# Patient Record
Sex: MALE | Race: WHITE
[De-identification: names, ages, dates, MRNs, and addresses within clinical notes are randomized per-mention and may not be internally consistent; named-entity substitution may affect disease eponyms.]

---

## 2017-03-17 ENCOUNTER — HOSPITAL ENCOUNTER (OUTPATIENT)
Dept: HOSPITAL 47 - LABWHC1 | Age: 57
Discharge: HOME | End: 2017-03-17
Payer: COMMERCIAL

## 2017-03-17 DIAGNOSIS — Z51.81: Primary | ICD-10-CM

## 2017-03-17 DIAGNOSIS — Z79.899: ICD-10-CM

## 2017-03-17 LAB
ALP SERPL-CCNC: 62 U/L (ref 38–126)
ALT SERPL-CCNC: 42 U/L (ref 21–72)
ANION GAP SERPL CALC-SCNC: 10 MMOL/L
AST SERPL-CCNC: 30 U/L (ref 17–59)
BASOPHILS # BLD AUTO: 0 K/UL (ref 0–0.2)
BASOPHILS NFR BLD AUTO: 1 %
BILIRUBIN DIRECT+TOT PNL SERPL-MCNC: 0.2 MG/DL (ref 0–0.2)
BUN SERPL-SCNC: 20 MG/DL (ref 9–20)
CALCIUM SPEC-MCNC: 8.6 MG/DL (ref 8.4–10.2)
CH: 30.9
CHCM: 34
CHLORIDE SERPL-SCNC: 104 MMOL/L (ref 98–107)
CHOLEST SERPL-MCNC: 126 MG/DL (ref ?–200)
CO2 SERPL-SCNC: 30 MMOL/L (ref 22–30)
EOSINOPHIL # BLD AUTO: 0.1 K/UL (ref 0–0.7)
EOSINOPHIL NFR BLD AUTO: 4 %
ERYTHROCYTE [DISTWIDTH] IN BLOOD BY AUTOMATED COUNT: 5.17 M/UL (ref 4.3–5.9)
ERYTHROCYTE [DISTWIDTH] IN BLOOD: 13 % (ref 11.5–15.5)
GLUCOSE SERPL-MCNC: 89 MG/DL (ref 74–99)
HCT VFR BLD AUTO: 47.3 % (ref 39–53)
HDLC SERPL-MCNC: 42 MG/DL (ref 40–60)
HDW: 2.76
HEMOGLOBIN A1C: 5.4 % (ref 4.2–6.1)
HGB BLD-MCNC: 15.6 GM/DL (ref 13–17.5)
LUC NFR BLD AUTO: 3 %
LYMPHOCYTES # SPEC AUTO: 1 K/UL (ref 1–4.8)
LYMPHOCYTES NFR SPEC AUTO: 41 %
MCH RBC QN AUTO: 30.3 PG (ref 25–35)
MCHC RBC AUTO-ENTMCNC: 33.1 G/DL (ref 31–37)
MCV RBC AUTO: 91.5 FL (ref 80–100)
MONOCYTES # BLD AUTO: 0.2 K/UL (ref 0–1)
MONOCYTES NFR BLD AUTO: 8 %
NEUTROPHILS # BLD AUTO: 1 K/UL (ref 1.3–7.7)
NEUTROPHILS NFR BLD AUTO: 43 %
NON-AFRICAN AMERICAN GFR(MDRD): >60
POTASSIUM SERPL-SCNC: 4.2 MMOL/L (ref 3.5–5.1)
PROT SERPL-MCNC: 6.6 G/DL (ref 6.3–8.2)
SODIUM SERPL-SCNC: 144 MMOL/L (ref 137–145)
TRIGL SERPL-MCNC: 80 MG/DL (ref ?–150)
WBC # BLD AUTO: 0.07 10*3/UL
WBC # BLD AUTO: 2.4 K/UL (ref 3.8–10.6)
WBC (PEROX): 2.33

## 2017-03-17 PROCEDURE — 82248 BILIRUBIN DIRECT: CPT

## 2017-03-17 PROCEDURE — 80061 LIPID PANEL: CPT

## 2017-03-17 PROCEDURE — 83036 HEMOGLOBIN GLYCOSYLATED A1C: CPT

## 2017-03-17 PROCEDURE — 85025 COMPLETE CBC W/AUTO DIFF WBC: CPT

## 2017-03-17 PROCEDURE — 36415 COLL VENOUS BLD VENIPUNCTURE: CPT

## 2017-03-17 PROCEDURE — 84481 FREE ASSAY (FT-3): CPT

## 2017-03-17 PROCEDURE — 80053 COMPREHEN METABOLIC PANEL: CPT

## 2017-03-17 PROCEDURE — 84443 ASSAY THYROID STIM HORMONE: CPT

## 2017-03-17 PROCEDURE — 84439 ASSAY OF FREE THYROXINE: CPT

## 2017-03-28 ENCOUNTER — HOSPITAL ENCOUNTER (OUTPATIENT)
Dept: HOSPITAL 47 - LABWHC1 | Age: 57
Discharge: HOME | End: 2017-03-28
Payer: COMMERCIAL

## 2017-03-28 DIAGNOSIS — R63.4: ICD-10-CM

## 2017-03-28 DIAGNOSIS — D72.819: Primary | ICD-10-CM

## 2017-03-28 DIAGNOSIS — G72.9: ICD-10-CM

## 2017-03-28 LAB
BASOPHILS # BLD AUTO: 0 K/UL (ref 0–0.2)
BASOPHILS NFR BLD AUTO: 0 %
CH: 30.9
CHCM: 34.5
EOSINOPHIL # BLD AUTO: 0.1 K/UL (ref 0–0.7)
EOSINOPHIL NFR BLD AUTO: 1 %
ERYTHROCYTE [DISTWIDTH] IN BLOOD BY AUTOMATED COUNT: 5.16 M/UL (ref 4.3–5.9)
ERYTHROCYTE [DISTWIDTH] IN BLOOD: 12.8 % (ref 11.5–15.5)
HCT VFR BLD AUTO: 46.4 % (ref 39–53)
HDW: 2.84
HGB BLD-MCNC: 15.3 GM/DL (ref 13–17.5)
LUC NFR BLD AUTO: 2 %
LYMPHOCYTES # SPEC AUTO: 1.5 K/UL (ref 1–4.8)
LYMPHOCYTES NFR SPEC AUTO: 29 %
MCH RBC QN AUTO: 29.6 PG (ref 25–35)
MCHC RBC AUTO-ENTMCNC: 32.9 G/DL (ref 31–37)
MCV RBC AUTO: 90 FL (ref 80–100)
MONOCYTES # BLD AUTO: 0.3 K/UL (ref 0–1)
MONOCYTES NFR BLD AUTO: 6 %
NEUTROPHILS # BLD AUTO: 3.1 K/UL (ref 1.3–7.7)
NEUTROPHILS NFR BLD AUTO: 61 %
WBC # BLD AUTO: 0.09 10*3/UL
WBC # BLD AUTO: 5 K/UL (ref 3.8–10.6)
WBC (PEROX): 5.09

## 2017-03-28 PROCEDURE — 85652 RBC SED RATE AUTOMATED: CPT

## 2017-03-28 PROCEDURE — 85025 COMPLETE CBC W/AUTO DIFF WBC: CPT

## 2017-03-28 PROCEDURE — 86038 ANTINUCLEAR ANTIBODIES: CPT

## 2017-03-28 PROCEDURE — 82550 ASSAY OF CK (CPK): CPT

## 2017-03-28 PROCEDURE — 36415 COLL VENOUS BLD VENIPUNCTURE: CPT

## 2018-11-28 ENCOUNTER — HOSPITAL ENCOUNTER (OUTPATIENT)
Dept: HOSPITAL 47 - RADXRMAIN | Age: 58
Discharge: HOME | End: 2018-11-28
Payer: COMMERCIAL

## 2018-11-28 DIAGNOSIS — S43.401A: Primary | ICD-10-CM

## 2018-11-28 NOTE — XR
EXAMINATION TYPE: XR shoulder complete RT

 

DATE OF EXAM: 11/28/2018

 

COMPARISON: NONE

 

HISTORY: 58-year-old male right shoulder pain when lifting and reaching, sprain.

 

TECHNIQUE: 3 views

 

FINDINGS: 

AC joint appears intact and congruent. Subacromial space is preserved. No tendinous or bursal calcifi
cations. Smooth delineation to the greater tuberosity. No acute fracture, subluxation, or dislocation
.

 

 

 

IMPRESSION: 

No acute osseous abnormality seen.

## 2018-12-03 ENCOUNTER — HOSPITAL ENCOUNTER (OUTPATIENT)
Dept: HOSPITAL 47 - ORWHC2ENDO | Age: 58
Discharge: HOME | End: 2018-12-03
Payer: COMMERCIAL

## 2018-12-03 VITALS — HEART RATE: 64 BPM | SYSTOLIC BLOOD PRESSURE: 122 MMHG | DIASTOLIC BLOOD PRESSURE: 74 MMHG

## 2018-12-03 VITALS — RESPIRATION RATE: 16 BRPM | TEMPERATURE: 97.7 F

## 2018-12-03 VITALS — BODY MASS INDEX: 25 KG/M2

## 2018-12-03 DIAGNOSIS — D12.5: ICD-10-CM

## 2018-12-03 DIAGNOSIS — Z12.11: Primary | ICD-10-CM

## 2018-12-03 DIAGNOSIS — Z79.899: ICD-10-CM

## 2018-12-03 DIAGNOSIS — Z86.010: ICD-10-CM

## 2018-12-03 DIAGNOSIS — E78.5: ICD-10-CM

## 2018-12-03 PROCEDURE — 45380 COLONOSCOPY AND BIOPSY: CPT

## 2018-12-03 PROCEDURE — 88305 TISSUE EXAM BY PATHOLOGIST: CPT

## 2018-12-03 NOTE — P.PCN
Date of Procedure: 12/03/18


Procedure(s) Performed: 


Procedure: Colonoscopy and biopsy.





Preoperative diagnosis: Screening for neoplasia, patient has history of polyps.





Postoperative diagnosis: Diminutive sigmoid polyp biopsied, otherwise, exam to 

the cecum within normal limits.





Preparation: HalfLytely prep.





Sedation: Was provided by anesthesia.





Brief clinical history: The patient is a 58-year-old male who is scheduled for 

this evaluation because of history of polyps.  His last exam was in 2013.  The 

patient has no abdominal complaints, bleeding or anemia.





Procedure: With the patient on his left lateral decubitus position and after 

informed consent and adequate sedation, the perianal area was inspected and it 

did not show any fissures or fistulas.  There were no masses felt on digital 

rectal examination.  The Olympus CFQ 160L video colonoscope was then inserted 

in the rectum in the usual fashion and advanced to the cecum.  There was a 

diminutive polyp in the proximal sigmoid which I biopsied but there were no 

large polyps or cancer.  The mucosa appeared healthy.  I retroflexed the 

endoscope in the rectum before the endoscope was withdrawn.





The patient tolerated the procedure well.





Plan: The patient was reassured.  He will follow up with you as planned and I 

recommended repeat exam in 5 years.

## 2018-12-05 ENCOUNTER — HOSPITAL ENCOUNTER (OUTPATIENT)
Dept: HOSPITAL 47 - RADMRIMAIN | Age: 58
Discharge: HOME | End: 2018-12-05
Payer: COMMERCIAL

## 2018-12-05 DIAGNOSIS — M19.011: ICD-10-CM

## 2018-12-05 DIAGNOSIS — M75.21: ICD-10-CM

## 2018-12-05 DIAGNOSIS — M75.111: ICD-10-CM

## 2018-12-05 DIAGNOSIS — S43.491D: Primary | ICD-10-CM

## 2018-12-06 NOTE — MR
EXAMINATION TYPE: MR shoulder RT wo con

 

DATE OF EXAM: 12/5/2018

 

COMPARISON: 11/28/2018 right shoulder radiographs

 

HISTORY: Right shoulder pain

 

TECHNIQUE: Multiplanar, multisequence imaging of the right shoulder is performed without contrast.

 

FINDINGS:

 

Rotator Cuff: There is a low-grade partial-thickness bursal surface tear of the supraspinatus within 
the distal fibers after the myotendinous junction superimposed upon bursal surface fiber fraying and 
mild supraspinatus tendinopathy.

 

There is a partial thickness articular surface tear of the infraspinatus measuring 0.4 x 0.5 cm as we
ll as bursal surface fiber fraying superimposed upon mild tendinopathy.

 

The teres minor is unremarkable and muscle volume and signal.

 

The subscapularis is unremarkable and muscle volume and signal.

 

Acromioclavicular Joint: There is very mild bone marrow edema within the distal clavicle and moderate
 acromioclavicular arthropathy demonstrated as capsular hypertrophy, small marginal osteophytes and s
ubchondral cysts. Only minimal impingement on the supraspinatus is seen. No downsloping of the acromi
on.

 

Glenohumeral Joint: There is mild glenohumeral joint space narrowing and few osseous cysts of the hum
eral head. Subchondral 5 mm cyst is seen of the posterior superior glenoid with other small subchondr
al cysts of the glenoid.

 

Labrum: There is a posterior superior glenoid labrum tear extending into the inferior labrum with a 3
 mm associated paralabral cyst. There is at least labral degeneration of the anterior superior glenoi
d labrum.

 

Biceps Tendon: The long head of biceps is in normal location within bicipital groove. There is attenu
ation of the intra-articular portion of the biceps tendon compatible with mild tendinopathy.

 

Other: A very small amount of fluid is seen within the subcoracoid bursa as well as within the subdel
toid/subacromial bursa.

 

IMPRESSION: 

1. Low-grade partial-thickness bursal surface tear of the supraspinatus superimposed upon mild tendin
opathy and bursal surface fiber fraying.

2. Low-grade partial-thickness articular surface tear of the infraspinatus superimposed upon mild ten
dinopathy with bursal surface fraying.

3. Mild bone marrow edema of the distal clavicle that may relate to the associated moderate acromiocl
avicular arthropathy or osseous contusion. Correlate for recent trauma/injury. No distinct fracture i
s seen.

4. Mild intra-articular portion biceps tendinosis.

5. Posterior superior glenoid labral tear and associated paralabral cyst with extension into the infe
rior labrum and at least anterior superior glenoid labrum degeneration.

6. Mild glenohumeral arthropathy.

7. Trace fluid within the subcoracoid as well as the subacromial/subdeltoid bursa that may clinically
 correlate with bursitis.

## 2020-07-01 ENCOUNTER — HOSPITAL ENCOUNTER (EMERGENCY)
Dept: HOSPITAL 47 - EC | Age: 60
Discharge: HOME | End: 2020-07-01
Payer: COMMERCIAL

## 2020-07-01 VITALS
RESPIRATION RATE: 18 BRPM | HEART RATE: 74 BPM | DIASTOLIC BLOOD PRESSURE: 88 MMHG | TEMPERATURE: 98.2 F | SYSTOLIC BLOOD PRESSURE: 145 MMHG
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
## 2020-07-01 DIAGNOSIS — S61.412A: Primary | ICD-10-CM
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
## 2020-07-01 DIAGNOSIS — Z23: ICD-10-CM

## 2020-07-01 PROCEDURE — 90715 TDAP VACCINE 7 YRS/> IM: CPT

## 2020-07-01 PROCEDURE — 99282 EMERGENCY DEPT VISIT SF MDM: CPT

## 2020-07-01 PROCEDURE — 90471 IMMUNIZATION ADMIN: CPT

## 2020-07-01 PROCEDURE — 12002 RPR S/N/AX/GEN/TRNK2.6-7.5CM: CPT

## 2020-07-01 NOTE — ED
Wound/Laceration HPI





- General


Chief Complaint: Wound/Laceration


Stated Complaint: Hand Laceration


Time Seen by Provider: 07/01/20 09:38


Source: patient, RN notes reviewed


Mode of arrival: ambulatory


Limitations: no limitations





- History of Present Illness


Initial Comments: 





This is a 60-year-old male presents emergency Department chief complaint lacerat

ion to his left home.  Patient states he used using a utility not to cut open a 

box when he cut his left hand.  He is unsure when his last tetanus was.  Denies 

any increased sensation or decreased strength of his left hand or digits.  

Patient denies any other noted injuries at this time.  Bleeding is controlled 

with pressure.





- Related Data


                                Home Medications











 Medication  Instructions  Recorded  Confirmed


 


Simvastatin [Zocor] 40 mg PO HS 07/01/20 07/01/20











                                    Allergies











Allergy/AdvReac Type Severity Reaction Status Date / Time


 


No Known Allergies Allergy   Verified 07/01/20 09:58














Review of Systems


ROS Statement: 


Those systems with pertinent positive or pertinent negative responses have been 

documented in the HPI.





ROS Other: All systems not noted in ROS Statement are negative.





Past Medical History


Past Medical History: No Reported History


History of Any Multi-Drug Resistant Organisms: None Reported


Past Surgical History: Hernia Repair


Additional Past Surgical History / Comment(s): lung sx


Past Psychological History: No Psychological Hx Reported


Smoking Status: Never smoker


Past Alcohol Use History: None Reported


Past Drug Use History: None Reported





General Exam


Limitations: no limitations


General appearance: alert, in no apparent distress


Respiratory exam: Present: normal lung sounds bilaterally.  Absent: respiratory 

distress, wheezes, rales, rhonchi, stridor


Cardiovascular Exam: Present: regular rate, normal rhythm, normal heart sounds. 

 Absent: systolic murmur, diastolic murmur, rubs, gallop, clicks


Extremities exam: Present: other (Left hand palmar aspect there is a 3 cm 

laceration radial pulses are equal bilaterally, capillary refill of all digits 

less than 2 seconds)


Neurological exam: Present: reflexes normal.  Absent: motor sensory deficit


Skin exam: Present: warm, dry, normal color.  Absent: rash





Course


                                   Vital Signs











  07/01/20





  09:24


 


Temperature 98.2 F


 


Pulse Rate 74


 


Respiratory 18





Rate 


 


Blood Pressure 145/88


 


O2 Sat by Pulse 96





Oximetry 














Procedures





- Laceration


  ** Laceration #1


Consent Obtained: verbal consent


Indication: laceration


Site: hand (Left hand palmar aspect)


Size (cm): 3


Description: flap, irregular


Depth: simple, single layer


Anesthetic Used: lidocaine 1%, without epi


Anesthesia Technique: local infiltration


Amount (mls): 6


Pre-repair: wound explored, irrigated extensively, deep structures intact


Type of Sutures: nylon


Size of Sutures: 4-0, 6-0


Number of Sutures: 6


Technique: simple, interrupted


Patient Tolerated Procedure: well, no complications





Medical Decision Making





- Medical Decision Making





60-year-old male presented for laceration.  Patient's tetanus is updated 

patient's laceration was closed using 6 sutures.  Patient is neurovascularly 

intact will have follow-up in 24 hours and return for any worsening symptoms.  

Wound care infections were provided.





Disposition


Clinical Impression: 


 Laceration of right hand





Disposition: HOME SELF-CARE


Condition: Stable


Instructions (If sedation given, give patient instructions):  Care For Your S

titches (ED), Laceration (ED)


Additional Instructions: 


Has sutures removed in 10 days.Please return to the Emergency Department if 

symptoms worsen or any other concerns.


Is patient prescribed a controlled substance at d/c from ED?: No


Referrals: 


Miguel Hernandez MD [Primary Care Provider] - 1-2 days


Time of Disposition: 10:04
spouse

## 2023-04-14 ENCOUNTER — HOSPITAL ENCOUNTER (OUTPATIENT)
Dept: HOSPITAL 47 - RADXRMAIN | Age: 63
Discharge: HOME | End: 2023-04-14
Attending: INTERNAL MEDICINE
Payer: COMMERCIAL

## 2023-04-14 DIAGNOSIS — J40: Primary | ICD-10-CM

## 2023-04-14 PROCEDURE — 71046 X-RAY EXAM CHEST 2 VIEWS: CPT

## 2023-04-14 NOTE — XR
EXAMINATION TYPE: XR chest 2V

 

DATE OF EXAM: 4/14/2023 4:32 PM

 

COMPARISON: Chest radiographs from 5/30/2018.

 

TECHNIQUE: XR chest 2V Frontal and lateral views of the chest.

 

CLINICAL INDICATION:Male, 63 years old with history of J40; 

 

FINDINGS: 

Lungs/Pleura: There is no evidence of pleural effusion, focal consolidation, or pneumothorax.  

Pulmonary vascularity: Unremarkable.

Heart/mediastinum: Cardiomediastinal silhouette is unremarkable.

Musculoskeletal: No acute osseous pathology.

 

IMPRESSION: 

No acute cardiopulmonary disease/process.